# Patient Record
Sex: FEMALE | Race: BLACK OR AFRICAN AMERICAN | NOT HISPANIC OR LATINO | Employment: FULL TIME | URBAN - METROPOLITAN AREA
[De-identification: names, ages, dates, MRNs, and addresses within clinical notes are randomized per-mention and may not be internally consistent; named-entity substitution may affect disease eponyms.]

---

## 2023-08-24 ENCOUNTER — LAB (OUTPATIENT)
Dept: LAB | Facility: LAB | Age: 43
End: 2023-08-24
Payer: COMMERCIAL

## 2023-08-24 ENCOUNTER — OFFICE VISIT (OUTPATIENT)
Dept: PRIMARY CARE | Facility: CLINIC | Age: 43
End: 2023-08-24
Payer: COMMERCIAL

## 2023-08-24 VITALS
OXYGEN SATURATION: 97 % | SYSTOLIC BLOOD PRESSURE: 168 MMHG | WEIGHT: 211.5 LBS | HEART RATE: 84 BPM | DIASTOLIC BLOOD PRESSURE: 94 MMHG | TEMPERATURE: 97.6 F

## 2023-08-24 DIAGNOSIS — I10 PRIMARY HYPERTENSION: ICD-10-CM

## 2023-08-24 DIAGNOSIS — Z11.4 ENCOUNTER FOR SCREENING FOR HIV: ICD-10-CM

## 2023-08-24 DIAGNOSIS — I10 PRIMARY HYPERTENSION: Primary | ICD-10-CM

## 2023-08-24 DIAGNOSIS — J40 BRONCHITIS: ICD-10-CM

## 2023-08-24 DIAGNOSIS — E55.9 VITAMIN D DEFICIENCY: ICD-10-CM

## 2023-08-24 DIAGNOSIS — Z00.00 HEALTHCARE MAINTENANCE: ICD-10-CM

## 2023-08-24 DIAGNOSIS — Z11.59 NEED FOR HEPATITIS C SCREENING TEST: ICD-10-CM

## 2023-08-24 DIAGNOSIS — Z12.31 ENCOUNTER FOR SCREENING MAMMOGRAM FOR MALIGNANT NEOPLASM OF BREAST: ICD-10-CM

## 2023-08-24 LAB
ALANINE AMINOTRANSFERASE (SGPT) (U/L) IN SER/PLAS: 18 U/L (ref 7–45)
ALBUMIN (G/DL) IN SER/PLAS: 4.6 G/DL (ref 3.4–5)
ALKALINE PHOSPHATASE (U/L) IN SER/PLAS: 60 U/L (ref 33–110)
ANION GAP IN SER/PLAS: 13 MMOL/L (ref 10–20)
ASPARTATE AMINOTRANSFERASE (SGOT) (U/L) IN SER/PLAS: 15 U/L (ref 9–39)
BASOPHILS (10*3/UL) IN BLOOD BY AUTOMATED COUNT: 0.06 X10E9/L (ref 0–0.1)
BASOPHILS/100 LEUKOCYTES IN BLOOD BY AUTOMATED COUNT: 1.1 % (ref 0–2)
BILIRUBIN TOTAL (MG/DL) IN SER/PLAS: 0.3 MG/DL (ref 0–1.2)
CALCIDIOL (25 OH VITAMIN D3) (NG/ML) IN SER/PLAS: 15 NG/ML
CALCIUM (MG/DL) IN SER/PLAS: 9.6 MG/DL (ref 8.6–10.3)
CARBON DIOXIDE, TOTAL (MMOL/L) IN SER/PLAS: 25 MMOL/L (ref 21–32)
CHLORIDE (MMOL/L) IN SER/PLAS: 103 MMOL/L (ref 98–107)
CHOLESTEROL (MG/DL) IN SER/PLAS: 238 MG/DL (ref 0–199)
CHOLESTEROL IN HDL (MG/DL) IN SER/PLAS: 32.9 MG/DL
CHOLESTEROL/HDL RATIO: 7.2
CREATININE (MG/DL) IN SER/PLAS: 0.83 MG/DL (ref 0.5–1.05)
EOSINOPHILS (10*3/UL) IN BLOOD BY AUTOMATED COUNT: 0.09 X10E9/L (ref 0–0.7)
EOSINOPHILS/100 LEUKOCYTES IN BLOOD BY AUTOMATED COUNT: 1.6 % (ref 0–6)
ERYTHROCYTE DISTRIBUTION WIDTH (RATIO) BY AUTOMATED COUNT: 15.1 % (ref 11.5–14.5)
ERYTHROCYTE MEAN CORPUSCULAR HEMOGLOBIN CONCENTRATION (G/DL) BY AUTOMATED: 31 G/DL (ref 32–36)
ERYTHROCYTE MEAN CORPUSCULAR VOLUME (FL) BY AUTOMATED COUNT: 85 FL (ref 80–100)
ERYTHROCYTES (10*6/UL) IN BLOOD BY AUTOMATED COUNT: 5.38 X10E12/L (ref 4–5.2)
GFR FEMALE: 89 ML/MIN/1.73M2
GLUCOSE (MG/DL) IN SER/PLAS: 90 MG/DL (ref 74–99)
HEMATOCRIT (%) IN BLOOD BY AUTOMATED COUNT: 45.8 % (ref 36–46)
HEMOGLOBIN (G/DL) IN BLOOD: 14.2 G/DL (ref 12–16)
HEPATITIS C VIRUS AB PRESENCE IN SERUM: NONREACTIVE
HIV 1/ 2 AG/AB SCREEN: NONREACTIVE
IMMATURE GRANULOCYTES/100 LEUKOCYTES IN BLOOD BY AUTOMATED COUNT: 0.5 % (ref 0–0.9)
LDL: 180 MG/DL (ref 0–99)
LEUKOCYTES (10*3/UL) IN BLOOD BY AUTOMATED COUNT: 5.5 X10E9/L (ref 4.4–11.3)
LYMPHOCYTES (10*3/UL) IN BLOOD BY AUTOMATED COUNT: 1.63 X10E9/L (ref 1.2–4.8)
LYMPHOCYTES/100 LEUKOCYTES IN BLOOD BY AUTOMATED COUNT: 29.6 % (ref 13–44)
MONOCYTES (10*3/UL) IN BLOOD BY AUTOMATED COUNT: 0.45 X10E9/L (ref 0.1–1)
MONOCYTES/100 LEUKOCYTES IN BLOOD BY AUTOMATED COUNT: 8.2 % (ref 2–10)
NEUTROPHILS (10*3/UL) IN BLOOD BY AUTOMATED COUNT: 3.25 X10E9/L (ref 1.2–7.7)
NEUTROPHILS/100 LEUKOCYTES IN BLOOD BY AUTOMATED COUNT: 59 % (ref 40–80)
PLATELETS (10*3/UL) IN BLOOD AUTOMATED COUNT: 334 X10E9/L (ref 150–450)
POTASSIUM (MMOL/L) IN SER/PLAS: 4.2 MMOL/L (ref 3.5–5.3)
PROTEIN TOTAL: 7 G/DL (ref 6.4–8.2)
SODIUM (MMOL/L) IN SER/PLAS: 137 MMOL/L (ref 136–145)
THYROTROPIN (MIU/L) IN SER/PLAS BY DETECTION LIMIT <= 0.05 MIU/L: 0.93 MIU/L (ref 0.44–3.98)
TRIGLYCERIDE (MG/DL) IN SER/PLAS: 128 MG/DL (ref 0–149)
UREA NITROGEN (MG/DL) IN SER/PLAS: 13 MG/DL (ref 6–23)
VLDL: 26 MG/DL (ref 0–40)

## 2023-08-24 PROCEDURE — 3080F DIAST BP >= 90 MM HG: CPT | Performed by: NURSE PRACTITIONER

## 2023-08-24 PROCEDURE — 3077F SYST BP >= 140 MM HG: CPT | Performed by: NURSE PRACTITIONER

## 2023-08-24 PROCEDURE — 4004F PT TOBACCO SCREEN RCVD TLK: CPT | Performed by: NURSE PRACTITIONER

## 2023-08-24 PROCEDURE — 80061 LIPID PANEL: CPT

## 2023-08-24 PROCEDURE — 86803 HEPATITIS C AB TEST: CPT

## 2023-08-24 PROCEDURE — 99204 OFFICE O/P NEW MOD 45 MIN: CPT | Performed by: NURSE PRACTITIONER

## 2023-08-24 PROCEDURE — 85025 COMPLETE CBC W/AUTO DIFF WBC: CPT

## 2023-08-24 PROCEDURE — 36415 COLL VENOUS BLD VENIPUNCTURE: CPT

## 2023-08-24 PROCEDURE — 80053 COMPREHEN METABOLIC PANEL: CPT

## 2023-08-24 PROCEDURE — 82306 VITAMIN D 25 HYDROXY: CPT

## 2023-08-24 PROCEDURE — 87389 HIV-1 AG W/HIV-1&-2 AB AG IA: CPT

## 2023-08-24 PROCEDURE — 84443 ASSAY THYROID STIM HORMONE: CPT

## 2023-08-24 RX ORDER — AMLODIPINE BESYLATE 5 MG/1
5 TABLET ORAL DAILY
Qty: 30 TABLET | Refills: 0 | Status: SHIPPED | OUTPATIENT
Start: 2023-08-24 | End: 2023-09-07 | Stop reason: SDUPTHER

## 2023-08-24 RX ORDER — ALBUTEROL SULFATE 90 UG/1
2 AEROSOL, METERED RESPIRATORY (INHALATION) EVERY 4 HOURS PRN
Qty: 8.5 G | Refills: 0 | Status: SHIPPED | OUTPATIENT
Start: 2023-08-24 | End: 2024-08-23

## 2023-08-24 RX ORDER — METHYLPREDNISOLONE 4 MG/1
TABLET ORAL
Qty: 1 EACH | Refills: 0 | Status: SHIPPED | OUTPATIENT
Start: 2023-08-24 | End: 2023-09-07 | Stop reason: ALTCHOICE

## 2023-08-24 ASSESSMENT — ENCOUNTER SYMPTOMS
DIARRHEA: 0
HEMATOLOGIC/LYMPHATIC NEGATIVE: 1
SORE THROAT: 0
ACTIVITY CHANGE: 0
NAUSEA: 0
ALLERGIC/IMMUNOLOGIC NEGATIVE: 1
CONSTIPATION: 0
RHINORRHEA: 0
VOMITING: 0
ABDOMINAL DISTENTION: 0
SHORTNESS OF BREATH: 0
ENDOCRINE NEGATIVE: 1
EYES NEGATIVE: 1
COUGH: 1
MUSCULOSKELETAL NEGATIVE: 1
SINUS PRESSURE: 0
LIGHT-HEADEDNESS: 0
CHEST TIGHTNESS: 0
ABDOMINAL PAIN: 0
SINUS PAIN: 0
DIZZINESS: 0
PALPITATIONS: 0
FEVER: 0
CHILLS: 0
WHEEZING: 1
FATIGUE: 0
WEAKNESS: 0
PSYCHIATRIC NEGATIVE: 1
NUMBNESS: 0
HEADACHES: 1

## 2023-08-24 NOTE — PROGRESS NOTES
Subjective   Patient ID: Jany Roberts is a 42 y.o. female who presents for New Patient Visit, Headache (Was in urgent care last week- viral sinusitis), and Hypertension.    New patient to office, here to establish  Patient has not had a PCP for several years  Patient went to urgent care last week for viral sinusitis, she tested negative twice for COVID.  She had a cough, wheezing, headache.  Found to be hypertensive.  Family history of hypertension.  Blood pressure elevated here today.  We will start amlodipine 5 mg daily.  Needs labs and follow-up in 2 weeks.  Headaches improving.  Continues to have cough, wheezing.  Current everyday smoker, half pack per day.  Start Medrol Dosepak for acute bronchitis.  Albuterol inhaler as needed.  Strongly encouraged to stop smoking  Needs complete labs  Mammogram ordered  Referral to gynecology    Preventive:  CRC screen:  Mammogram: Ordered  DEXA scan:  PAP:  CT cardiac scoring:  LDCT lung cancer screening:         Review of Systems   Constitutional:  Negative for activity change, chills, fatigue and fever.   HENT:  Negative for congestion, rhinorrhea, sinus pressure, sinus pain and sore throat.    Eyes: Negative.    Respiratory:  Positive for cough and wheezing. Negative for chest tightness and shortness of breath.    Cardiovascular:  Negative for chest pain, palpitations and leg swelling.   Gastrointestinal:  Negative for abdominal distention, abdominal pain, constipation, diarrhea, nausea and vomiting.   Endocrine: Negative.    Genitourinary: Negative.    Musculoskeletal: Negative.    Skin: Negative.    Allergic/Immunologic: Negative.    Neurological:  Positive for headaches. Negative for dizziness, syncope, weakness, light-headedness and numbness.   Hematological: Negative.    Psychiatric/Behavioral: Negative.     All other systems reviewed and are negative.      Objective   BP (!) 168/94   Pulse 84   Temp 36.4 °C (97.6 °F)   Wt 95.9 kg (211 lb 8 oz)   SpO2 97%      Physical Exam  Vitals and nursing note reviewed.   Constitutional:       General: She is not in acute distress.     Appearance: Normal appearance. She is not ill-appearing.   HENT:      Head: Normocephalic and atraumatic.      Right Ear: Tympanic membrane, ear canal and external ear normal.      Left Ear: Tympanic membrane, ear canal and external ear normal.      Nose: Nose normal.      Mouth/Throat:      Mouth: Mucous membranes are moist.      Pharynx: Oropharynx is clear.   Eyes:      Pupils: Pupils are equal, round, and reactive to light.   Cardiovascular:      Rate and Rhythm: Normal rate and regular rhythm.      Pulses: Normal pulses.      Heart sounds: Normal heart sounds. No murmur heard.  Pulmonary:      Effort: Pulmonary effort is normal. No respiratory distress.      Breath sounds: Normal breath sounds. No wheezing.   Abdominal:      General: Bowel sounds are normal. There is no distension.      Palpations: Abdomen is soft.      Tenderness: There is no abdominal tenderness.   Musculoskeletal:         General: No tenderness. Normal range of motion.      Cervical back: Normal range of motion and neck supple.      Right lower leg: No edema.      Left lower leg: No edema.   Skin:     General: Skin is warm and dry.      Capillary Refill: Capillary refill takes less than 2 seconds.      Coloration: Skin is not jaundiced.   Neurological:      General: No focal deficit present.      Mental Status: She is alert and oriented to person, place, and time.      Motor: No weakness.   Psychiatric:         Mood and Affect: Mood normal.         Behavior: Behavior normal.         Thought Content: Thought content normal.         Judgment: Judgment normal.         Assessment/Plan     #Hypertension  -Start amlodipine 5 mg daily  -Low fat/cholesterol, low sodium, low carbohydrate diet  -Exercise as tolerated 150 minutes/week, increase activity slowly  -Weight loss  -Strongly encouraged to stop smoking  #Acute  bronchitis  -Start Medrol Dosepak  -Albuterol inhaler as needed  -Strongly encouraged to stop smoking  -Over-the-counter Mucinex or Robitussin according to package directions as needed for cough  -Tylenol 650-1000 mg every 8 hours as needed for pain  -Drink plenty of fluids  -Get plenty of rest  -Call the office if symptoms worsen or do not improve  #Health maintenance  -Due for complete labs  -Due for mammogram  -Referral to gynecology    Follow-up 2 weeks for annual physical and as needed

## 2023-09-07 ENCOUNTER — OFFICE VISIT (OUTPATIENT)
Dept: PRIMARY CARE | Facility: CLINIC | Age: 43
End: 2023-09-07
Payer: COMMERCIAL

## 2023-09-07 VITALS
HEART RATE: 85 BPM | OXYGEN SATURATION: 100 % | TEMPERATURE: 97.6 F | HEIGHT: 66 IN | SYSTOLIC BLOOD PRESSURE: 106 MMHG | DIASTOLIC BLOOD PRESSURE: 80 MMHG | WEIGHT: 206.5 LBS | BODY MASS INDEX: 33.19 KG/M2

## 2023-09-07 DIAGNOSIS — Z13.31 DEPRESSION SCREENING: ICD-10-CM

## 2023-09-07 DIAGNOSIS — Z00.00 VISIT FOR PREVENTIVE HEALTH EXAMINATION: Primary | ICD-10-CM

## 2023-09-07 DIAGNOSIS — F17.210 CIGARETTE NICOTINE DEPENDENCE WITHOUT COMPLICATION: ICD-10-CM

## 2023-09-07 DIAGNOSIS — I10 PRIMARY HYPERTENSION: ICD-10-CM

## 2023-09-07 DIAGNOSIS — E55.9 VITAMIN D DEFICIENCY: ICD-10-CM

## 2023-09-07 DIAGNOSIS — E78.5 DYSLIPIDEMIA: ICD-10-CM

## 2023-09-07 DIAGNOSIS — E66.9 CLASS 1 OBESITY WITH SERIOUS COMORBIDITY AND BODY MASS INDEX (BMI) OF 33.0 TO 33.9 IN ADULT, UNSPECIFIED OBESITY TYPE: ICD-10-CM

## 2023-09-07 PROCEDURE — 3074F SYST BP LT 130 MM HG: CPT | Performed by: NURSE PRACTITIONER

## 2023-09-07 PROCEDURE — 1123F ACP DISCUSS/DSCN MKR DOCD: CPT | Performed by: NURSE PRACTITIONER

## 2023-09-07 PROCEDURE — 3079F DIAST BP 80-89 MM HG: CPT | Performed by: NURSE PRACTITIONER

## 2023-09-07 PROCEDURE — 99396 PREV VISIT EST AGE 40-64: CPT | Performed by: NURSE PRACTITIONER

## 2023-09-07 PROCEDURE — 4004F PT TOBACCO SCREEN RCVD TLK: CPT | Performed by: NURSE PRACTITIONER

## 2023-09-07 PROCEDURE — 3008F BODY MASS INDEX DOCD: CPT | Performed by: NURSE PRACTITIONER

## 2023-09-07 RX ORDER — ERGOCALCIFEROL 1.25 MG/1
1.25 CAPSULE ORAL
Qty: 12 CAPSULE | Refills: 0 | Status: SHIPPED | OUTPATIENT
Start: 2023-09-07 | End: 2023-11-27

## 2023-09-07 RX ORDER — AMLODIPINE BESYLATE 5 MG/1
5 TABLET ORAL DAILY
Qty: 90 TABLET | Refills: 0 | Status: SHIPPED | OUTPATIENT
Start: 2023-09-07 | End: 2023-12-22 | Stop reason: SDUPTHER

## 2023-09-07 ASSESSMENT — ENCOUNTER SYMPTOMS
WEAKNESS: 0
SINUS PRESSURE: 0
LIGHT-HEADEDNESS: 0
NUMBNESS: 0
ALLERGIC/IMMUNOLOGIC NEGATIVE: 1
ABDOMINAL PAIN: 0
SORE THROAT: 0
CHILLS: 0
ABDOMINAL DISTENTION: 0
RHINORRHEA: 0
DIZZINESS: 0
PSYCHIATRIC NEGATIVE: 1
FATIGUE: 0
SINUS PAIN: 0
LOSS OF SENSATION IN FEET: 0
SHORTNESS OF BREATH: 0
ACTIVITY CHANGE: 0
PALPITATIONS: 0
DIARRHEA: 0
HEMATOLOGIC/LYMPHATIC NEGATIVE: 1
ENDOCRINE NEGATIVE: 1
NAUSEA: 0
CONSTIPATION: 0
DEPRESSION: 0
MUSCULOSKELETAL NEGATIVE: 1
FEVER: 0
VOMITING: 0
OCCASIONAL FEELINGS OF UNSTEADINESS: 0
CHEST TIGHTNESS: 0
EYES NEGATIVE: 1

## 2023-09-07 ASSESSMENT — PATIENT HEALTH QUESTIONNAIRE - PHQ9
SUM OF ALL RESPONSES TO PHQ9 QUESTIONS 1 AND 2: 0
2. FEELING DOWN, DEPRESSED OR HOPELESS: NOT AT ALL
1. LITTLE INTEREST OR PLEASURE IN DOING THINGS: NOT AT ALL

## 2023-09-07 ASSESSMENT — COLUMBIA-SUICIDE SEVERITY RATING SCALE - C-SSRS
2. HAVE YOU ACTUALLY HAD ANY THOUGHTS OF KILLING YOURSELF?: NO
6. HAVE YOU EVER DONE ANYTHING, STARTED TO DO ANYTHING, OR PREPARED TO DO ANYTHING TO END YOUR LIFE?: NO
1. IN THE PAST MONTH, HAVE YOU WISHED YOU WERE DEAD OR WISHED YOU COULD GO TO SLEEP AND NOT WAKE UP?: NO

## 2023-09-07 NOTE — PROGRESS NOTES
Subjective   Patient ID: Jany Roberts is a 42 y.o. female who presents for Hypertension, Flu Vaccine (Decline), and Results (BW).    Annual physical  Hypertension, improved with amlodipine 5 mg daily.  Acute bronchitis, improved after Medrol Dosepak. Current everyday smoker, 1/2 ppd. Albuterol inhaler as needed.  Strongly encouraged to stop smoking  Labs reviewed  Vitamin D deficiency, start vitamin D 1.25 mg weekly.  Dyslipidemia.  Strongly encouraged to stop smoking.  Recommend low fat/low cholesterol diet, eat more fresh foods, avoid processed/prepackaged/fast foods, increase physical activity, weight loss. Mediterranean diet is heart healthy.  Recommend lifestyle changes and recheck lipid in 6 months.  The 10-year ASCVD risk score (Brett CHAUDHRY, et al., 2019) is: 7.6%    Values used to calculate the score:      Age: 42 years      Sex: Female      Is Non- : No      Diabetic: No      Tobacco smoker: Yes      Systolic Blood Pressure: 106 mmHg      Is BP treated: Yes      HDL Cholesterol: 32.9 mg/dL      Total Cholesterol: 238 mg/dL  Mammogram ordered  Referral to gynecology    Preventive:  CRC screen:  Mammogram: Ordered  DEXA scan:  PAP:  CT cardiac scoring:  LDCT lung cancer screening:    Lab on 08/24/2023  WBC                                           Date: 08/24/2023  Value: 5.5         Ref range: 4.4 - 11.3 x10E9*  Status: Final  RBC                                           Date: 08/24/2023  Value: 5.38 (H)    Ref range: 4.00 - 5.20 x10E*  Status: Final  Hemoglobin                                    Date: 08/24/2023  Value: 14.2        Ref range: 12.0 - 16.0 g/dL   Status: Final  Hematocrit                                    Date: 08/24/2023  Value: 45.8        Ref range: 36.0 - 46.0 %      Status: Final  MCV                                           Date: 08/24/2023  Value: 85          Ref range: 80 - 100 fL        Status: Final  MCHC                                          Date:  08/24/2023  Value: 31.0 (L)    Ref range: 32.0 - 36.0 g/dL   Status: Final  Platelets                                     Date: 08/24/2023  Value: 334         Ref range: 150 - 450 x10E9/L  Status: Final  RDW                                           Date: 08/24/2023  Value: 15.1 (H)    Ref range: 11.5 - 14.5 %      Status: Final  Neutrophils %                                 Date: 08/24/2023  Value: 59.0        Ref range: 40.0 - 80.0 %      Status: Final  Immature Granulocytes %, Automated            Date: 08/24/2023  Value: 0.5         Ref range: 0.0 - 0.9 %        Status: Final                Comment:  Immature Granulocyte Count (IG) includes promyelocytes,    myelocytes and metamyelocytes but does not include bands.   Percent differential counts (%) should be interpreted in the   context of the absolute cell counts (cells/L).  Lymphocytes %                                 Date: 08/24/2023  Value: 29.6        Ref range: 13.0 - 44.0 %      Status: Final  Monocytes %                                   Date: 08/24/2023  Value: 8.2         Ref range: 2.0 - 10.0 %       Status: Final  Eosinophils %                                 Date: 08/24/2023  Value: 1.6         Ref range: 0.0 - 6.0 %        Status: Final  Basophils %                                   Date: 08/24/2023  Value: 1.1         Ref range: 0.0 - 2.0 %        Status: Final  Neutrophils Absolute                          Date: 08/24/2023  Value: 3.25        Ref range: 1.20 - 7.70 x10E*  Status: Final  Lymphocytes Absolute                          Date: 08/24/2023  Value: 1.63        Ref range: 1.20 - 4.80 x10E*  Status: Final  Monocytes Absolute                            Date: 08/24/2023  Value: 0.45        Ref range: 0.10 - 1.00 x10E*  Status: Final  Eosinophils Absolute                          Date: 08/24/2023  Value: 0.09        Ref range: 0.00 - 0.70 x10E*  Status: Final  Basophils Absolute                            Date: 08/24/2023  Value: 0.06         Ref range: 0.00 - 0.10 x10E*  Status: Final  Glucose                                       Date: 08/24/2023  Value: 90          Ref range: 74 - 99 mg/dL      Status: Final  Sodium                                        Date: 08/24/2023  Value: 137         Ref range: 136 - 145 mmol/L   Status: Final  Potassium                                     Date: 08/24/2023  Value: 4.2         Ref range: 3.5 - 5.3 mmol/L   Status: Final  Chloride                                      Date: 08/24/2023  Value: 103         Ref range: 98 - 107 mmol/L    Status: Final  Bicarbonate                                   Date: 08/24/2023  Value: 25          Ref range: 21 - 32 mmol/L     Status: Final  Anion Gap                                     Date: 08/24/2023  Value: 13          Ref range: 10 - 20 mmol/L     Status: Final  Urea Nitrogen                                 Date: 08/24/2023  Value: 13          Ref range: 6 - 23 mg/dL       Status: Final  Creatinine                                    Date: 08/24/2023  Value: 0.83        Ref range: 0.50 - 1.05 mg/dL  Status: Final  GFR Female                                    Date: 08/24/2023  Value: 89          Ref range: >90 mL/min/1.73m2  Status: Final                Comment:  CALCULATIONS OF ESTIMATED GFR ARE PERFORMED   USING THE 2021 CKD-EPI STUDY REFIT EQUATION   WITHOUT THE RACE VARIABLE FOR THE IDMS-TRACEABLE   CREATININE METHODS.    https://jasn.asnjournals.org/content/early/2021/09/22/ASN.9769592275  Calcium                                       Date: 08/24/2023  Value: 9.6         Ref range: 8.6 - 10.3 mg/dL   Status: Final  Albumin                                       Date: 08/24/2023  Value: 4.6         Ref range: 3.4 - 5.0 g/dL     Status: Final  Alkaline Phosphatase                          Date: 08/24/2023  Value: 60          Ref range: 33 - 110 U/L       Status: Final  Total Protein                                 Date: 08/24/2023  Value: 7.0         Ref range: 6.4 - 8.2 g/dL      Status: Final  AST                                           Date: 08/24/2023  Value: 15          Ref range: 9 - 39 U/L         Status: Final  Total Bilirubin                               Date: 08/24/2023  Value: 0.3         Ref range: 0.0 - 1.2 mg/dL    Status: Final  ALT (SGPT)                                    Date: 08/24/2023  Value: 18          Ref range: 7 - 45 U/L         Status: Final                Comment:  Patients treated with Sulfasalazine may generate    falsely decreased results for ALT.  Cholesterol                                   Date: 08/24/2023  Value: 238 (H)     Ref range: 0 - 199 mg/dL      Status: Final                Comment: .      AGE      DESIRABLE   BORDERLINE HIGH   HIGH     0-19 Y     0 - 169       170 - 199     >/= 200    20-24 Y     0 - 189       190 - 224     >/= 225         >24 Y     0 - 199       200 - 239     >/= 240   **All ranges are based on fasting samples. Specific   therapeutic targets will vary based on patient-specific   cardiac risk.  .   Pediatric guidelines reference:Pediatrics 2011, 128(S5).   Adult guidelines reference: NCEP ATPIII Guidelines,     VICTORINA 2001, 258:2486-97  .   Venipuncture immediately after or during the    administration of Metamizole may lead to falsely   low results. Testing should be performed immediately   prior to Metamizole dosing.  HDL                                           Date: 08/24/2023  Value: 32.9 (A)    Ref range: mg/dL              Status: Final                Comment: .      AGE      VERY LOW   LOW     NORMAL    HIGH       0-19 Y       < 35   < 40     40-45     ----    20-24 Y       ----   < 40       >45     ----      >24 Y       ----   < 40     40-60      >60  .  Cholesterol/HDL Ratio                         Date: 08/24/2023  Value: 7.2 (A)       Status: Final                Comment: REF VALUES  DESIRABLE  < 3.4  HIGH RISK  > 5.0  LDL                                           Date: 08/24/2023  Value: 180 (H)     Ref range: 0  - 99 mg/dL       Status: Final                Comment: .                           NEAR      BORD      AGE      DESIRABLE  OPTIMAL    HIGH     HIGH     VERY HIGH     0-19 Y     0 - 109     ---    110-129   >/= 130     ----    20-24 Y     0 - 119     ---    120-159   >/= 160     ----      >24 Y     0 -  99   100-129  130-159   160-189     >/=190  .  VLDL                                          Date: 08/24/2023  Value: 26          Ref range: 0 - 40 mg/dL       Status: Final  Triglycerides                                 Date: 08/24/2023  Value: 128         Ref range: 0 - 149 mg/dL      Status: Final                Comment: .      AGE      DESIRABLE   BORDERLINE HIGH   HIGH     VERY HIGH   0 D-90 D    19 - 174         ----         ----        ----  91 D- 9 Y     0 -  74        75 -  99     >/= 100      ----    10-19 Y     0 -  89        90 - 129     >/= 130      ----    20-24 Y     0 - 114       115 - 149     >/= 150      ----         >24 Y     0 - 149       150 - 199    200- 499    >/= 500  .   Venipuncture immediately after or during the    administration of Metamizole may lead to falsely   low results. Testing should be performed immediately   prior to Metamizole dosing.  TSH                                           Date: 08/24/2023  Value: 0.93        Ref range: 0.44 - 3.98 mIU/L  Status: Final                Comment:  TSH testing is performed using different testing    methodology at [facility] than at other    [facility]. Direct result comparisons should    only be made within the same method.  Vitamin D, 25-Hydroxy                         Date: 08/24/2023  Value: 15 (A)      Ref range: ng/mL              Status: Final                Comment: .  DEFICIENCY:         < 20   NG/ML  INSUFFICIENCY:      20-29  NG/ML  SUFFICIENCY:         NG/ML    THIS ASSAY ACCURATELY QUANTIFIES THE SUM OF  VITAMIN D3, 25-HYDROXY AND VIT D2,25-HYDROXY.  Hepatitis C Ab                                Date:  08/24/2023  Value: NONREACTIVE Ref range: NONREACTIVE        Status: Final                Comment:  Results from patients taking biotin supplements or receiving   high-dose biotin therapy should be interpreted with caution   due to possible interference with this test. Providers may    contact their local laboratory for further information.  HIV 1 and 2 Screen                            Date: 08/24/2023  Value: NONREACTIVE Ref range: NONREACTIVE        Status: Final                Comment:  HIV Ag/Ab screen is performed using the Siemens Pay4later   HIV Ag/Ab Combo assay which detects the presence of HIV    p24 antigen as well as antibodies to HIV-1   (Group M and O) and HIV-2.  .  No laboratory evidence of HIV infection. If acute HIV infection is   suspected, consider testing for HIV RNA by PCR (viral load).         Review of Systems   Constitutional:  Negative for activity change, chills, fatigue and fever.   HENT:  Negative for congestion, rhinorrhea, sinus pressure, sinus pain and sore throat.    Eyes: Negative.    Respiratory:  Negative for chest tightness and shortness of breath.    Cardiovascular:  Negative for chest pain, palpitations and leg swelling.   Gastrointestinal:  Negative for abdominal distention, abdominal pain, constipation, diarrhea, nausea and vomiting.   Endocrine: Negative.    Genitourinary: Negative.    Musculoskeletal: Negative.    Skin: Negative.    Allergic/Immunologic: Negative.    Neurological:  Negative for dizziness, syncope, weakness, light-headedness and numbness.   Hematological: Negative.    Psychiatric/Behavioral: Negative.     All other systems reviewed and are negative.      Objective   /80   Pulse 85   Temp 36.4 °C (97.6 °F)   Wt 93.7 kg (206 lb 8 oz)   SpO2 100%     Physical Exam  Vitals and nursing note reviewed.   Constitutional:       General: She is not in acute distress.     Appearance: Normal appearance. She is obese. She is not ill-appearing.   HENT:       Head: Normocephalic and atraumatic.      Right Ear: Tympanic membrane, ear canal and external ear normal.      Left Ear: Tympanic membrane, ear canal and external ear normal.      Nose: Nose normal.      Mouth/Throat:      Mouth: Mucous membranes are moist.      Pharynx: Oropharynx is clear.   Eyes:      Pupils: Pupils are equal, round, and reactive to light.   Cardiovascular:      Rate and Rhythm: Normal rate and regular rhythm.      Pulses: Normal pulses.      Heart sounds: Normal heart sounds. No murmur heard.  Pulmonary:      Effort: Pulmonary effort is normal. No respiratory distress.      Breath sounds: Normal breath sounds. No wheezing.   Abdominal:      General: Bowel sounds are normal. There is no distension.      Palpations: Abdomen is soft.      Tenderness: There is no abdominal tenderness.   Musculoskeletal:         General: No tenderness. Normal range of motion.      Cervical back: Normal range of motion and neck supple.      Right lower leg: No edema.      Left lower leg: No edema.   Skin:     General: Skin is warm and dry.      Capillary Refill: Capillary refill takes less than 2 seconds.      Coloration: Skin is not jaundiced.   Neurological:      General: No focal deficit present.      Mental Status: She is alert and oriented to person, place, and time.      Motor: No weakness.   Psychiatric:         Mood and Affect: Mood normal.         Behavior: Behavior normal.         Thought Content: Thought content normal.         Judgment: Judgment normal.         Assessment/Plan     #Hypertension  -Continue amlodipine 5 mg daily  -Low fat/cholesterol, low sodium, low carbohydrate diet  -Exercise as tolerated 150 minutes/week, increase activity slowly  -Weight loss  -Strongly encouraged to stop smoking  #Dyslipidemia  -Low fat/low cholesterol diet  -Eat more fresh foods  -Avoid processed/prepackaged/fast foods  -Gradually increase physical activity  -Weight loss  -Strongly encouraged to stop smoking  #Health  maintenance  -Due for mammogram  -Referral to gynecology  #Obesity, BMI 33  -Avoid sweets and sugary drinks  -Drink plenty of water  -Avoid processed foods and refined foods  -Eat fruits, vegetables, lean protein, whole grains  -Increase your activity level  #Vitamin D deficiency  -Start vitamin D 1.25 mg weekly  -Monitor vitamin D     Follow-up 3 months and as needed

## 2023-11-24 DIAGNOSIS — E55.9 VITAMIN D DEFICIENCY: ICD-10-CM

## 2023-11-27 RX ORDER — ERGOCALCIFEROL 1.25 MG/1
1.25 CAPSULE ORAL
Qty: 12 CAPSULE | Refills: 0 | Status: SHIPPED | OUTPATIENT
Start: 2023-11-27 | End: 2023-12-29 | Stop reason: ALTCHOICE

## 2023-12-02 ENCOUNTER — APPOINTMENT (OUTPATIENT)
Dept: OBSTETRICS AND GYNECOLOGY | Facility: CLINIC | Age: 43
End: 2023-12-02
Payer: COMMERCIAL

## 2023-12-07 ENCOUNTER — APPOINTMENT (OUTPATIENT)
Dept: PRIMARY CARE | Facility: CLINIC | Age: 43
End: 2023-12-07
Payer: COMMERCIAL

## 2023-12-16 ENCOUNTER — APPOINTMENT (OUTPATIENT)
Dept: OBSTETRICS AND GYNECOLOGY | Facility: CLINIC | Age: 43
End: 2023-12-16
Payer: COMMERCIAL

## 2023-12-22 ENCOUNTER — APPOINTMENT (OUTPATIENT)
Dept: PRIMARY CARE | Facility: CLINIC | Age: 43
End: 2023-12-22
Payer: COMMERCIAL

## 2023-12-22 DIAGNOSIS — I10 PRIMARY HYPERTENSION: ICD-10-CM

## 2023-12-22 RX ORDER — AMLODIPINE BESYLATE 5 MG/1
5 TABLET ORAL DAILY
Qty: 7 TABLET | Refills: 0 | Status: SHIPPED | OUTPATIENT
Start: 2023-12-22 | End: 2023-12-29 | Stop reason: SDUPTHER

## 2023-12-29 ENCOUNTER — OFFICE VISIT (OUTPATIENT)
Dept: PRIMARY CARE | Facility: CLINIC | Age: 43
End: 2023-12-29
Payer: COMMERCIAL

## 2023-12-29 VITALS
HEART RATE: 59 BPM | SYSTOLIC BLOOD PRESSURE: 116 MMHG | WEIGHT: 197.47 LBS | OXYGEN SATURATION: 100 % | DIASTOLIC BLOOD PRESSURE: 74 MMHG | TEMPERATURE: 96.7 F | BODY MASS INDEX: 32.12 KG/M2

## 2023-12-29 DIAGNOSIS — I10 PRIMARY HYPERTENSION: Primary | ICD-10-CM

## 2023-12-29 DIAGNOSIS — E66.9 CLASS 1 OBESITY WITH SERIOUS COMORBIDITY AND BODY MASS INDEX (BMI) OF 32.0 TO 32.9 IN ADULT, UNSPECIFIED OBESITY TYPE: ICD-10-CM

## 2023-12-29 DIAGNOSIS — E55.9 VITAMIN D DEFICIENCY: ICD-10-CM

## 2023-12-29 DIAGNOSIS — E78.2 MIXED HYPERLIPIDEMIA: ICD-10-CM

## 2023-12-29 PROCEDURE — 3074F SYST BP LT 130 MM HG: CPT | Performed by: NURSE PRACTITIONER

## 2023-12-29 PROCEDURE — 3008F BODY MASS INDEX DOCD: CPT | Performed by: NURSE PRACTITIONER

## 2023-12-29 PROCEDURE — 4004F PT TOBACCO SCREEN RCVD TLK: CPT | Performed by: NURSE PRACTITIONER

## 2023-12-29 PROCEDURE — 99214 OFFICE O/P EST MOD 30 MIN: CPT | Performed by: NURSE PRACTITIONER

## 2023-12-29 PROCEDURE — 3078F DIAST BP <80 MM HG: CPT | Performed by: NURSE PRACTITIONER

## 2023-12-29 RX ORDER — CHOLECALCIFEROL (VITAMIN D3) 125 MCG
125 CAPSULE ORAL DAILY
Qty: 90 CAPSULE | Refills: 1 | Status: SHIPPED | OUTPATIENT
Start: 2023-12-29

## 2023-12-29 RX ORDER — AMLODIPINE BESYLATE 5 MG/1
5 TABLET ORAL DAILY
Qty: 90 TABLET | Refills: 0 | Status: SHIPPED | OUTPATIENT
Start: 2023-12-29 | End: 2023-12-29 | Stop reason: SDUPTHER

## 2023-12-29 RX ORDER — AMLODIPINE BESYLATE 5 MG/1
5 TABLET ORAL DAILY
Qty: 90 TABLET | Refills: 1 | Status: SHIPPED | OUTPATIENT
Start: 2023-12-29

## 2023-12-29 NOTE — PROGRESS NOTES
Subjective   Patient ID: Jany Roberts is a 43 y.o. female who presents for Hypertension and Med Refill.    Hypertension, improved with amlodipine 5 mg daily.  Vitamin D deficiency, start vitamin D 5000 units daily  Dyslipidemia.  Strongly encouraged to stop smoking.  Recommend low fat/low cholesterol diet, eat more fresh foods, avoid processed/prepackaged/fast foods, increase physical activity, weight loss. Mediterranean diet is heart healthy.  Recommend lifestyle changes and recheck lipid in 6 months.  She has been making lifestyle changes, 9 pound weight loss since last visit.  The 10-year ASCVD risk score (Brett CHAUDHRY, et al., 2019) is: 14.9%    Values used to calculate the score:      Age: 43 years      Sex: Female      Is Non- : Yes      Diabetic: No      Tobacco smoker: Yes      Systolic Blood Pressure: 131 mmHg      Is BP treated: Yes      HDL Cholesterol: 32.9 mg/dL      Total Cholesterol: 238 mg/dL    Preventive:  CRC screen:  Mammogram: 9/2023 negative  DEXA scan:  PAP:  CT cardiac scoring:  LDCT lung cancer screening:    BI mammo bilateral screening tomosynthesis  Narrative: Interpreted By:  ANNABEL PILLAI MD  MRN: [id number]  Patient Name: JANY ROBERTS     STUDY:  DIGITAL MAMM SCREENING W/ RONNIE;  9/11/2023 3:15 pm     ACCESSION NUMBER(S):  [id number]     ORDERING CLINICIAN:  JOSELYN GALEANO     INDICATION:  Screening.     COMPARISON:  05/16/2019 outside bilateral screening mammogram.     FINDINGS:  2D and tomosynthesis images were reviewed at 1 mm slice thickness.     Density:  There are areas of scattered fibroglandular tissue.     No suspicious masses or calcifications are identified.     Impression: No mammographic evidence of malignancy.     BI-RADS CATEGORY:     Category: 1 - Negative.  Recommendation: 1 Year Screening.     For any future breast imaging appointments, please call [phone]).        MACRO:  None       Review of Systems   Constitutional:  Negative  for activity change, chills, fatigue and fever.   HENT:  Negative for congestion, rhinorrhea, sinus pressure, sinus pain and sore throat.    Eyes: Negative.    Respiratory:  Negative for chest tightness and shortness of breath.    Cardiovascular:  Negative for chest pain, palpitations and leg swelling.   Gastrointestinal:  Negative for abdominal distention, abdominal pain, constipation, diarrhea, nausea and vomiting.   Endocrine: Negative.    Genitourinary: Negative.    Musculoskeletal: Negative.    Skin: Negative.    Allergic/Immunologic: Negative.    Neurological:  Negative for dizziness, syncope, weakness, light-headedness and numbness.   Hematological: Negative.    Psychiatric/Behavioral: Negative.     All other systems reviewed and are negative.      Objective   /74   Pulse 59   Temp 35.9 °C (96.7 °F)   Wt 89.6 kg (197 lb 7.5 oz)   SpO2 100%   BMI 32.12 kg/m²     Physical Exam  Vitals and nursing note reviewed.   Constitutional:       General: She is not in acute distress.     Appearance: Normal appearance. She is obese. She is not ill-appearing.   HENT:      Head: Normocephalic and atraumatic.      Right Ear: Tympanic membrane, ear canal and external ear normal.      Left Ear: Tympanic membrane, ear canal and external ear normal.      Nose: Nose normal.      Mouth/Throat:      Mouth: Mucous membranes are moist.      Pharynx: Oropharynx is clear.   Eyes:      Pupils: Pupils are equal, round, and reactive to light.   Cardiovascular:      Rate and Rhythm: Normal rate and regular rhythm.      Pulses: Normal pulses.      Heart sounds: Normal heart sounds. No murmur heard.  Pulmonary:      Effort: Pulmonary effort is normal. No respiratory distress.      Breath sounds: Normal breath sounds. No wheezing.   Abdominal:      General: Bowel sounds are normal. There is no distension.      Palpations: Abdomen is soft.      Tenderness: There is no abdominal tenderness.   Musculoskeletal:         General: No  tenderness. Normal range of motion.      Cervical back: Normal range of motion and neck supple.      Right lower leg: No edema.      Left lower leg: No edema.   Skin:     General: Skin is warm and dry.      Capillary Refill: Capillary refill takes less than 2 seconds.      Coloration: Skin is not jaundiced.   Neurological:      General: No focal deficit present.      Mental Status: She is alert and oriented to person, place, and time.      Motor: No weakness.   Psychiatric:         Mood and Affect: Mood normal.         Behavior: Behavior normal.         Thought Content: Thought content normal.         Judgment: Judgment normal.         Assessment/Plan     #Hypertension, controlled  -Continue amlodipine 5 mg daily  -Low fat/cholesterol, low sodium, low carbohydrate diet  -Exercise as tolerated 150 minutes/week, increase activity slowly  -Weight loss  -Strongly encouraged to stop smoking  #Dyslipidemia  -Low fat/low cholesterol diet  -Eat more fresh foods  -Avoid processed/prepackaged/fast foods  -Gradually increase physical activity  -Weight loss  -Strongly encouraged to stop smoking  #Obesity, BMI 32  -Avoid sweets and sugary drinks  -Drink plenty of water  -Avoid processed foods and refined foods  -Eat fruits, vegetables, lean protein, whole grains  -Increase your activity level  #Vitamin D deficiency  -Start vitamin D 125 mcg daily  -Monitor vitamin D     Follow-up 6 months and as needed

## 2024-01-06 ENCOUNTER — OFFICE VISIT (OUTPATIENT)
Dept: OBSTETRICS AND GYNECOLOGY | Facility: CLINIC | Age: 44
End: 2024-01-06
Payer: COMMERCIAL

## 2024-01-06 VITALS
SYSTOLIC BLOOD PRESSURE: 131 MMHG | WEIGHT: 198 LBS | BODY MASS INDEX: 32.2 KG/M2 | DIASTOLIC BLOOD PRESSURE: 85 MMHG | HEART RATE: 91 BPM

## 2024-01-06 DIAGNOSIS — Z12.4 CERVICAL CANCER SCREENING: ICD-10-CM

## 2024-01-06 DIAGNOSIS — Z01.419 WELL WOMAN EXAM: Primary | ICD-10-CM

## 2024-01-06 PROCEDURE — 88175 CYTOPATH C/V AUTO FLUID REDO: CPT

## 2024-01-06 PROCEDURE — 99386 PREV VISIT NEW AGE 40-64: CPT | Performed by: MIDWIFE

## 2024-01-06 PROCEDURE — 3008F BODY MASS INDEX DOCD: CPT | Performed by: MIDWIFE

## 2024-01-06 PROCEDURE — 3075F SYST BP GE 130 - 139MM HG: CPT | Performed by: MIDWIFE

## 2024-01-06 PROCEDURE — 87624 HPV HI-RISK TYP POOLED RSLT: CPT

## 2024-01-06 PROCEDURE — 3079F DIAST BP 80-89 MM HG: CPT | Performed by: MIDWIFE
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
## 2024-01-07 PROBLEM — E78.2 MIXED HYPERLIPIDEMIA: Status: ACTIVE | Noted: 2024-01-07

## 2024-01-07 ASSESSMENT — ENCOUNTER SYMPTOMS
VOMITING: 0
NAUSEA: 0
NUMBNESS: 0
HEMATOLOGIC/LYMPHATIC NEGATIVE: 1
MUSCULOSKELETAL NEGATIVE: 1
WEAKNESS: 0
PSYCHIATRIC NEGATIVE: 1
SINUS PAIN: 0
DIARRHEA: 0
CHILLS: 0
SINUS PRESSURE: 0
FATIGUE: 0
DIZZINESS: 0
ENDOCRINE NEGATIVE: 1
PALPITATIONS: 0
RHINORRHEA: 0
ACTIVITY CHANGE: 0
ABDOMINAL DISTENTION: 0
EYES NEGATIVE: 1
FEVER: 0
SORE THROAT: 0
ALLERGIC/IMMUNOLOGIC NEGATIVE: 1
ABDOMINAL PAIN: 0
SHORTNESS OF BREATH: 0
CONSTIPATION: 0
CHEST TIGHTNESS: 0
LIGHT-HEADEDNESS: 0

## 2024-06-21 DIAGNOSIS — E55.9 VITAMIN D DEFICIENCY: ICD-10-CM

## 2024-06-21 RX ORDER — CHOLECALCIFEROL (VITAMIN D3) 125 MCG
125 CAPSULE ORAL DAILY
Qty: 90 CAPSULE | Refills: 1 | Status: SHIPPED | OUTPATIENT
Start: 2024-06-21

## 2024-06-27 ASSESSMENT — ENCOUNTER SYMPTOMS
CHILLS: 0
ABDOMINAL DISTENTION: 0
ENDOCRINE NEGATIVE: 1
ACTIVITY CHANGE: 0
LIGHT-HEADEDNESS: 0
SINUS PRESSURE: 0
ABDOMINAL PAIN: 0
NUMBNESS: 0
DIARRHEA: 0
WEAKNESS: 0
CONSTIPATION: 0
CHEST TIGHTNESS: 0
FEVER: 0
EYES NEGATIVE: 1
SHORTNESS OF BREATH: 0
ALLERGIC/IMMUNOLOGIC NEGATIVE: 1
DIZZINESS: 0
PSYCHIATRIC NEGATIVE: 1
PALPITATIONS: 0
MUSCULOSKELETAL NEGATIVE: 1
HEMATOLOGIC/LYMPHATIC NEGATIVE: 1
SORE THROAT: 0
VOMITING: 0
NAUSEA: 0
RHINORRHEA: 0
FATIGUE: 0
SINUS PAIN: 0

## 2024-06-27 NOTE — PROGRESS NOTES
Subjective   Patient ID: Jany Roberts is a 43 y.o. female who presents for Hypertension.    Hypertension, improved with amlodipine 5 mg daily.  Vitamin D deficiency, continue vitamin D 5000 units daily  Dyslipidemia.  Strongly encouraged to stop smoking.  Recommend low fat/low cholesterol diet, eat more fresh foods, avoid processed/prepackaged/fast foods, increase physical activity, weight loss. Recommend Mediterranean diet.  Due for complete labs  Due for colonoscopy    Preventive:  CRC screen:  Mammogram: 9/2023 negative  DEXA scan:  PAP:  CT cardiac scoring:  LDCT lung cancer screening:        The 10-year ASCVD risk score (Brett CHAUDHRY, et al., 2019) is: 11.7%    Values used to calculate the score:      Age: 43 years      Sex: Female      Is Non- : Yes      Diabetic: No      Tobacco smoker: Yes      Systolic Blood Pressure: 124 mmHg      Is BP treated: Yes      HDL Cholesterol: 32.9 mg/dL      Total Cholesterol: 238 mg/dL    No visits with results within 3 Month(s) from this visit.   Latest known visit with results is:   Office Visit on 01/06/2024   Component Date Value Ref Range Status    Case Report 01/06/2024    Final                    Value:Gynecologic Cytology                              Case: [id number]                                   Authorizing Provider:  MATT Perez, Collected:           01/06/2024 1436                                     [place]                                                                           Ordering Location:     [facility] Received:            01/06/2024 1436              First Screen:          Yaz Burger, CT                                                   Specimen:    ThinPrep Liquid-Based Pap-Imaging System Screen, CERVIX, SCREENING                         Final Cytological Interpretation 01/06/2024    Final                    Value:This result contains rich text formatting which cannot be displayed here.       01/06/2024    Final                    Value:This result contains rich text formatting which cannot be displayed here.    Educational Note 01/06/2024    Final                    Value:This result contains rich text formatting which cannot be displayed here.    Perform HPV HR test? 01/06/2024 Always (all interpretations)   Final    Include HPV Genotype? 01/06/2024 Yes   Final    LMP 01/06/2024 12/22/2023   Final    HPV, high-risk 01/06/2024 Negative  Negative Final    HPV Type 16 DNA 01/06/2024 Negative  Negative Final    HPV Type 18 DNA 01/06/2024 Negative  Negative Final    HPV non-Type 16 or 18 DNA 01/06/2024 Negative  Negative Final       BI mammo bilateral screening tomosynthesis  Narrative: Interpreted By:  ANNABEL PILLAI MD  MRN: [id number]  Patient Name: RACHEL ROSE     STUDY:  DIGITAL MAMM SCREENING W/ RONNIE;  9/11/2023 3:15 pm     ACCESSION NUMBER(S):  [id number]     ORDERING CLINICIAN:  JOSELYN GALEANO     INDICATION:  Screening.     COMPARISON:  05/16/2019 outside bilateral screening mammogram.     FINDINGS:  2D and tomosynthesis images were reviewed at 1 mm slice thickness.     Density:  There are areas of scattered fibroglandular tissue.     No suspicious masses or calcifications are identified.     Impression: No mammographic evidence of malignancy.     BI-RADS CATEGORY:     Category: 1 - Negative.  Recommendation: 1 Year Screening.     For any future breast imaging appointments, please call [phone]).        MACRO:  None       Review of Systems   Constitutional:  Negative for activity change, chills, fatigue and fever.   HENT:  Negative for congestion, rhinorrhea, sinus pressure, sinus pain and sore throat.    Eyes: Negative.    Respiratory:  Negative for chest tightness and shortness of breath.    Cardiovascular:  Negative for chest pain, palpitations and leg swelling.   Gastrointestinal:  Negative for abdominal distention, abdominal pain, constipation, diarrhea, nausea and vomiting.    Endocrine: Negative.    Genitourinary: Negative.    Musculoskeletal: Negative.    Skin: Negative.    Allergic/Immunologic: Negative.    Neurological:  Negative for dizziness, syncope, weakness, light-headedness and numbness.   Hematological: Negative.    Psychiatric/Behavioral: Negative.     All other systems reviewed and are negative.      Objective   Visit Vitals  /78   Pulse 81   Temp 36.1 °C (96.9 °F)   Wt 91.5 kg (201 lb 12.8 oz)   SpO2 99%   BMI 32.82 kg/m²   OB Status Having periods   Smoking Status Every Day   BSA 2.06 m²      Physical Exam  Vitals and nursing note reviewed.   Constitutional:       General: She is not in acute distress.     Appearance: Normal appearance. She is obese. She is not ill-appearing.   HENT:      Head: Normocephalic and atraumatic.      Right Ear: Tympanic membrane, ear canal and external ear normal.      Left Ear: Tympanic membrane, ear canal and external ear normal.      Nose: Nose normal.      Mouth/Throat:      Mouth: Mucous membranes are moist.      Pharynx: Oropharynx is clear.   Eyes:      Pupils: Pupils are equal, round, and reactive to light.   Cardiovascular:      Rate and Rhythm: Normal rate and regular rhythm.      Pulses: Normal pulses.      Heart sounds: Normal heart sounds. No murmur heard.  Pulmonary:      Effort: Pulmonary effort is normal. No respiratory distress.      Breath sounds: Normal breath sounds. No wheezing.   Abdominal:      General: Bowel sounds are normal. There is no distension.      Palpations: Abdomen is soft.      Tenderness: There is no abdominal tenderness.   Musculoskeletal:         General: No tenderness. Normal range of motion.      Cervical back: Normal range of motion and neck supple.      Right lower leg: No edema.      Left lower leg: No edema.   Skin:     General: Skin is warm and dry.      Capillary Refill: Capillary refill takes less than 2 seconds.      Coloration: Skin is not jaundiced.   Neurological:      General: No focal  deficit present.      Mental Status: She is alert and oriented to person, place, and time.      Motor: No weakness.   Psychiatric:         Mood and Affect: Mood normal.         Behavior: Behavior normal.         Thought Content: Thought content normal.         Judgment: Judgment normal.         Assessment/Plan   Jany was seen today for hypertension.  Diagnoses and all orders for this visit:  Primary hypertension (Primary)  Mixed hyperlipidemia  Vitamin D deficiency  -     Vitamin D 25-Hydroxy,Total (for eval of Vitamin D levels); Future  Visit for preventive health examination  -     CBC and Auto Differential; Future  -     Comprehensive Metabolic Panel; Future  -     Lipid Panel; Future  -     TSH with reflex to Free T4 if abnormal; Future  -     Vitamin B12; Future  -     Hemoglobin A1C; Future  Encounter for colorectal cancer screening  -     Colonoscopy Screening; Average Risk Patient; Future  Class 1 obesity with serious comorbidity and body mass index (BMI) of 32.0 to 32.9 in adult, unspecified obesity type  Other orders  -     Follow Up In Primary Care - Health Maintenance; Future     # Hypertension, controlled  -Continue amlodipine 5 mg daily  -Low fat/cholesterol, low sodium, low carbohydrate diet  -Exercise as tolerated 150 minutes/week, increase activity slowly  -Weight loss  -Strongly encouraged to stop smoking  # Dyslipidemia  -Low fat/low cholesterol diet  -Eat more fresh foods  -Avoid processed/prepackaged/fast foods  -Gradually increase physical activity  -Weight loss  -Strongly encouraged to stop smoking  # Obesity, BMI 32  -Avoid sweets and sugary drinks  -Drink plenty of water  -Avoid processed foods and refined foods  -Eat fruits, vegetables, lean protein, whole grains  -Increase your activity level  # Vitamin D deficiency  -Continue vitamin D 125 mcg daily  -Monitor vitamin D    Follow-up 6 months for annual physical and as needed

## 2024-07-01 ENCOUNTER — APPOINTMENT (OUTPATIENT)
Dept: PRIMARY CARE | Facility: CLINIC | Age: 44
End: 2024-07-01
Payer: COMMERCIAL

## 2024-07-01 VITALS
WEIGHT: 201.8 LBS | TEMPERATURE: 96.9 F | OXYGEN SATURATION: 99 % | DIASTOLIC BLOOD PRESSURE: 78 MMHG | BODY MASS INDEX: 32.82 KG/M2 | SYSTOLIC BLOOD PRESSURE: 124 MMHG | HEART RATE: 81 BPM

## 2024-07-01 DIAGNOSIS — E55.9 VITAMIN D DEFICIENCY: ICD-10-CM

## 2024-07-01 DIAGNOSIS — Z12.11 ENCOUNTER FOR COLORECTAL CANCER SCREENING: ICD-10-CM

## 2024-07-01 DIAGNOSIS — E66.9 CLASS 1 OBESITY WITH SERIOUS COMORBIDITY AND BODY MASS INDEX (BMI) OF 32.0 TO 32.9 IN ADULT, UNSPECIFIED OBESITY TYPE: ICD-10-CM

## 2024-07-01 DIAGNOSIS — E78.2 MIXED HYPERLIPIDEMIA: ICD-10-CM

## 2024-07-01 DIAGNOSIS — I10 PRIMARY HYPERTENSION: Primary | ICD-10-CM

## 2024-07-01 DIAGNOSIS — Z12.12 ENCOUNTER FOR COLORECTAL CANCER SCREENING: ICD-10-CM

## 2024-07-01 DIAGNOSIS — Z00.00 VISIT FOR PREVENTIVE HEALTH EXAMINATION: ICD-10-CM

## 2024-07-01 PROCEDURE — 3078F DIAST BP <80 MM HG: CPT | Performed by: NURSE PRACTITIONER

## 2024-07-01 PROCEDURE — 99214 OFFICE O/P EST MOD 30 MIN: CPT | Performed by: NURSE PRACTITIONER

## 2024-07-01 PROCEDURE — 3074F SYST BP LT 130 MM HG: CPT | Performed by: NURSE PRACTITIONER

## 2024-07-01 PROCEDURE — 3008F BODY MASS INDEX DOCD: CPT | Performed by: NURSE PRACTITIONER

## 2024-07-02 PROBLEM — E66.9 CLASS 1 OBESITY WITH SERIOUS COMORBIDITY AND BODY MASS INDEX (BMI) OF 32.0 TO 32.9 IN ADULT: Status: ACTIVE | Noted: 2024-07-02

## 2024-09-27 DIAGNOSIS — I10 PRIMARY HYPERTENSION: ICD-10-CM

## 2024-09-27 RX ORDER — AMLODIPINE BESYLATE 5 MG/1
5 TABLET ORAL DAILY
Qty: 90 TABLET | Refills: 1 | Status: SHIPPED | OUTPATIENT
Start: 2024-09-27

## 2025-01-07 ENCOUNTER — APPOINTMENT (OUTPATIENT)
Dept: PRIMARY CARE | Facility: CLINIC | Age: 45
End: 2025-01-07
Payer: COMMERCIAL